# Patient Record
Sex: MALE | Employment: FULL TIME | URBAN - METROPOLITAN AREA
[De-identification: names, ages, dates, MRNs, and addresses within clinical notes are randomized per-mention and may not be internally consistent; named-entity substitution may affect disease eponyms.]

---

## 2024-10-14 ENCOUNTER — APPOINTMENT (OUTPATIENT)
Dept: CT IMAGING | Age: 59
End: 2024-10-14
Payer: COMMERCIAL

## 2024-10-14 ENCOUNTER — HOSPITAL ENCOUNTER (EMERGENCY)
Age: 59
Discharge: HOME | End: 2024-10-14
Payer: COMMERCIAL

## 2024-10-14 VITALS
DIASTOLIC BLOOD PRESSURE: 73 MMHG | OXYGEN SATURATION: 99 % | HEART RATE: 79 BPM | RESPIRATION RATE: 18 BRPM | SYSTOLIC BLOOD PRESSURE: 121 MMHG | TEMPERATURE: 97.5 F

## 2024-10-14 DIAGNOSIS — S16.1XXA ACUTE STRAIN OF NECK MUSCLE, INITIAL ENCOUNTER: Primary | ICD-10-CM

## 2024-10-14 DIAGNOSIS — M62.838 NECK MUSCLE SPASM: ICD-10-CM

## 2024-10-14 DIAGNOSIS — M50.30 DDD (DEGENERATIVE DISC DISEASE), CERVICAL: ICD-10-CM

## 2024-10-14 PROCEDURE — 96372 THER/PROPH/DIAG INJ SC/IM: CPT

## 2024-10-14 PROCEDURE — 99284 EMERGENCY DEPT VISIT MOD MDM: CPT

## 2024-10-14 PROCEDURE — 6360000002 HC RX W HCPCS

## 2024-10-14 PROCEDURE — 6370000000 HC RX 637 (ALT 250 FOR IP)

## 2024-10-14 PROCEDURE — 72125 CT NECK SPINE W/O DYE: CPT

## 2024-10-14 RX ORDER — KETOROLAC TROMETHAMINE 30 MG/ML
30 INJECTION, SOLUTION INTRAMUSCULAR; INTRAVENOUS ONCE
Status: COMPLETED | OUTPATIENT
Start: 2024-10-14 | End: 2024-10-14

## 2024-10-14 RX ORDER — DEXAMETHASONE SODIUM PHOSPHATE 10 MG/ML
10 INJECTION INTRAMUSCULAR; INTRAVENOUS ONCE
Status: COMPLETED | OUTPATIENT
Start: 2024-10-14 | End: 2024-10-14

## 2024-10-14 RX ORDER — LIDOCAINE 4 G/G
1 PATCH TOPICAL DAILY
Qty: 30 PATCH | Refills: 0 | Status: SHIPPED | OUTPATIENT
Start: 2024-10-14 | End: 2024-11-13

## 2024-10-14 RX ORDER — LIDOCAINE 4 G/G
1 PATCH TOPICAL ONCE
Status: DISCONTINUED | OUTPATIENT
Start: 2024-10-14 | End: 2024-10-15 | Stop reason: HOSPADM

## 2024-10-14 RX ORDER — ORPHENADRINE CITRATE 30 MG/ML
60 INJECTION INTRAMUSCULAR; INTRAVENOUS ONCE
Status: COMPLETED | OUTPATIENT
Start: 2024-10-14 | End: 2024-10-14

## 2024-10-14 RX ORDER — PREDNISONE 50 MG/1
50 TABLET ORAL DAILY
Qty: 5 TABLET | Refills: 0 | Status: SHIPPED | OUTPATIENT
Start: 2024-10-14 | End: 2024-10-19

## 2024-10-14 RX ORDER — ORPHENADRINE CITRATE 100 MG/1
100 TABLET, EXTENDED RELEASE ORAL 2 TIMES DAILY
Qty: 20 TABLET | Refills: 0 | Status: SHIPPED | OUTPATIENT
Start: 2024-10-14 | End: 2024-10-24

## 2024-10-14 RX ADMIN — ORPHENADRINE CITRATE 60 MG: 30 INJECTION, SOLUTION INTRAMUSCULAR; INTRAVENOUS at 20:42

## 2024-10-14 RX ADMIN — DEXAMETHASONE SODIUM PHOSPHATE 10 MG: 10 INJECTION INTRAMUSCULAR; INTRAVENOUS at 20:40

## 2024-10-14 RX ADMIN — KETOROLAC TROMETHAMINE 30 MG: 30 INJECTION, SOLUTION INTRAMUSCULAR at 20:41

## 2024-10-14 ASSESSMENT — LIFESTYLE VARIABLES
HOW OFTEN DO YOU HAVE A DRINK CONTAINING ALCOHOL: NEVER
HOW MANY STANDARD DRINKS CONTAINING ALCOHOL DO YOU HAVE ON A TYPICAL DAY: PATIENT DOES NOT DRINK

## 2024-10-14 ASSESSMENT — PAIN SCALES - GENERAL: PAINLEVEL_OUTOF10: 10

## 2024-10-14 ASSESSMENT — PAIN DESCRIPTION - LOCATION: LOCATION: NECK

## 2024-10-14 ASSESSMENT — PAIN DESCRIPTION - ORIENTATION: ORIENTATION: RIGHT

## 2024-10-15 NOTE — DISCHARGE INSTR - COC
output data in the 24 hours ending 10/14/24 2224  No intake/output data recorded.    Safety Concerns:     { OLINDA Safety Concerns:977401079}    Impairments/Disabilities:      { OLINDA Impairments/Disabilities:573115141}    Nutrition Therapy:  Current Nutrition Therapy:   { OLINDA Diet List:171086894}    Routes of Feeding: {CHP DME Other Feedings:975357165}  Liquids: {Slp liquid thickness:88094}  Daily Fluid Restriction: {CHP DME Yes amt example:804074684}  Last Modified Barium Swallow with Video (Video Swallowing Test): {Done Not Done Date:}    Treatments at the Time of Hospital Discharge:   Respiratory Treatments: ***  Oxygen Therapy:  {Therapy; copd oxygen:09378}  Ventilator:    { CC Vent List:673207043}    Rehab Therapies: {THERAPEUTIC INTERVENTION:5744418383}  Weight Bearing Status/Restrictions: { CC Weight Bearin}  Other Medical Equipment (for information only, NOT a DME order):  {EQUIPMENT:869751898}  Other Treatments: ***    Patient's personal belongings (please select all that are sent with patient):  {[facility] DME Belongings:340447426}    RN SIGNATURE:  {Esignature:272678215}    CASE MANAGEMENT/SOCIAL WORK SECTION    Inpatient Status Date: ***    Readmission Risk Assessment Score:  Readmission Risk              Risk of Unplanned Readmission:  0           Discharging to Facility/ Agency   Name:   Address:  Phone:  Fax:    Dialysis Facility (if applicable)   Name:  Address:  Dialysis Schedule:  Phone:  Fax:    / signature: {Esignature:629371367}    PHYSICIAN SECTION    Prognosis: {Prognosis:7652931431}    Condition at Discharge: { Patient Condition:300149119}    Rehab Potential (if transferring to Rehab): {Prognosis:1339209620}    Recommended Labs or Other Treatments After Discharge: ***    Physician Certification: I certify the above information and transfer of Joao Chahal  is necessary for the continuing treatment of the diagnosis listed and that he requires {Admit

## 2024-10-15 NOTE — ED PROVIDER NOTES
with the patient in addition to providing specific details for the plan of care and counseling regarding the diagnosis and prognosis.  Questions are answered at this time and are agreeable with the plan.    Assessment     1. Acute strain of neck muscle, initial encounter    2. Neck muscle spasm    3. DDD (degenerative disc disease), cervical      Plan   Discharged home.  Patient condition is good    New Medications     Discharge Medication List as of 10/14/2024 10:11 PM        START taking these medications    Details   orphenadrine (NORFLEX) 100 MG extended release tablet Take 1 tablet by mouth 2 times daily for 10 days, Disp-20 tablet, R-0Normal      predniSONE (DELTASONE) 50 MG tablet Take 1 tablet by mouth daily for 5 days, Disp-5 tablet, R-0Normal      lidocaine 4 % external patch Place 1 patch onto the skin daily, TransDERmal, DAILY Starting Mon 10/14/2024, Until Wed 11/13/2024, For 30 days, Disp-30 patch, R-0, Normal           Electronically signed by FARIDA Rosales CNP   DD: 10/14/24  **This report was transcribed using voice recognition software. Every effort was made to ensure accuracy; however, inadvertent computerized transcription errors may be present.  END OF ED PROVIDER NOTE       Shahrzad Bills APRN - CNP  10/15/24 0151